# Patient Record
Sex: MALE | Employment: FULL TIME | URBAN - METROPOLITAN AREA
[De-identification: names, ages, dates, MRNs, and addresses within clinical notes are randomized per-mention and may not be internally consistent; named-entity substitution may affect disease eponyms.]

---

## 2021-10-12 ENCOUNTER — OFFICE VISIT (OUTPATIENT)
Dept: URGENT CARE | Facility: URGENT CARE | Age: 26
End: 2021-10-12
Payer: COMMERCIAL

## 2021-10-12 VITALS
SYSTOLIC BLOOD PRESSURE: 138 MMHG | OXYGEN SATURATION: 98 % | HEART RATE: 78 BPM | TEMPERATURE: 98 F | RESPIRATION RATE: 20 BRPM | DIASTOLIC BLOOD PRESSURE: 84 MMHG

## 2021-10-12 DIAGNOSIS — M54.6 RIGHT-SIDED THORACIC BACK PAIN, UNSPECIFIED CHRONICITY: Primary | ICD-10-CM

## 2021-10-12 PROCEDURE — 99203 OFFICE O/P NEW LOW 30 MIN: CPT | Performed by: FAMILY MEDICINE

## 2021-10-12 RX ORDER — CYCLOBENZAPRINE HCL 10 MG
10 TABLET ORAL 3 TIMES DAILY PRN
Qty: 10 TABLET | Refills: 0 | Status: SHIPPED | OUTPATIENT
Start: 2021-10-12

## 2021-10-12 ASSESSMENT — ENCOUNTER SYMPTOMS
HEMATURIA: 0
NAUSEA: 0
CONSTIPATION: 0
FREQUENCY: 0
ABDOMINAL PAIN: 0
UNEXPECTED WEIGHT CHANGE: 0
SHORTNESS OF BREATH: 0
FEVER: 0
DIARRHEA: 0
DYSURIA: 0
VOMITING: 0

## 2021-10-12 NOTE — PROGRESS NOTES
"Assessment & Plan     Jaron was seen today for urgent care and musculoskeletal problem.    Diagnoses and all orders for this visit:    Right-sided thoracic back pain, unspecified chronicity  -     cyclobenzaprine (FLEXERIL) 10 MG tablet; Take 1 tablet (10 mg) by mouth 3 times daily as needed for muscle spasms Do not drive within 8 hours of taking this medication.      Discussed risks and benefits of treatment strategies.    Patient Instructions     Stretching and strengthening exercises.     Heat/Ice 20 minutes three times daily.    Limit Aleve to 220 mg twice daily (with food) x 7 days.    Muscle relaxer (e.g. Flexeril) for breakthrough pain, only as prescribed.    Do not drive or fly within 8 hours of taking a muscle relaxer.    Follow up if your symptoms worsen or you are not improving over the next week.    Consider Physical Therapy in the future, only as appropriate.    Follow up in the ER immediately if you develop emergent symptoms, such as:  breathing concerns, severe/worsening back pain, progressive weakness, or incontinence.        Return for Follow up, as noted in Patient Instructions.    Gilda Harrell MD  [facility] ANURADHAALAN Salmeron is a 26 year old male, who presents to clinic today for the following health issues:    Chief Complaint   Patient presents with     Urgent Care     Musculoskeletal Problem     back pain        HPI    Back Pain    Onset of symptoms was 2 day(s) ago.  Location: right middle of back  Back pain feels \"like a stabbing\" with movement, currently 4-5/10 severity.  Radiation: does not radiate  Context: No injury, but back pain started after sitting in a simulator for 2 hours.  Severity of back pain was 10/10 initially, but it is 4-5/10 severity and improving the past 24 hours.  Denies: fecal incontinence, urinary incontinence, lower extremity numbness, lower extremity weakness and paresthesia    Aggravating Factors: changing positions " or prolonged sitting  Therapies to improve symptoms include: heat, Epson salts, and Aleve 220 mg  Past history: no prior back problems  Patient states that this is not a Worker's Compensation Case.  Patient is training to be a , currently visiting from [place].    Review of Systems   Constitutional: Negative for fever and unexpected weight change.   Respiratory: Negative for shortness of breath.    Cardiovascular: Negative for chest pain.   Gastrointestinal: Negative for abdominal pain, constipation, diarrhea, nausea and vomiting.   Genitourinary: Negative for dysuria, frequency and hematuria.         Objective    /84   Pulse 78   Temp 98  F (36.7  C) (Tympanic)   Resp 20   SpO2 98%   Physical Exam   GENERAL APPEARANCE:  Awake, alert, and in no acute distress.  PSYCHIATRIC:  Affect within normal limits.  HEENT:  Sclera anicteric.  No conjunctivitis.  No erythema, edema, or exudates of the oral mucosa or posterior pharynx.  NECK:  Spontaneous full range of motion.      HEART:  Normal S1, S2.  Regular rate and rhythm.  No murmurs, rubs, or gallops.  LUNGS: Lungs clear to auscultation.  No wheezes, rales, or rhonchi.  ABDOMEN:   Soft.  Not tender.  Not distended.  No mass.  BACK:  Back symmetric.  No curvature.  ROM full.  No CVA tenderness.  Midline tenderness not noted.  Paraspinal tenderness not noted, without evidence of muscle spasm.  Straight leg raise test negative bilaterally.  NEUROLOGIC:  Full range of motion and intact strength noted involving the lower extremities.  No weakness or sensory deficit.  Lower extremity reflexes are intact and symmetric.  No foot drop.  Gait non-antalgic.  Heel-toe-walk intact.  SKIN:  No rash.

## 2021-10-12 NOTE — PATIENT INSTRUCTIONS
Stretching and strengthening exercises.     Heat/Ice 20 minutes three times daily.    Limit Aleve to 220 mg twice daily (with food) x 7 days.    Muscle relaxer (e.g. Flexeril) for breakthrough pain, only as prescribed.    Do not drive or fly within 8 hours of taking a muscle relaxer.    Follow up if your symptoms worsen or you are not improving over the next week.    Consider Physical Therapy in the future, only as appropriate.    Follow up in the ER immediately if you develop emergent symptoms, such as:  breathing concerns, severe/worsening back pain, progressive weakness, or incontinence.

## 2024-02-28 ENCOUNTER — APPOINTMENT (RX ONLY)
Dept: URBAN - METROPOLITAN AREA CLINIC 91 | Facility: CLINIC | Age: 29
Setting detail: DERMATOLOGY
End: 2024-02-28

## 2024-02-28 DIAGNOSIS — L91.8 OTHER HYPERTROPHIC DISORDERS OF THE SKIN: ICD-10-CM | Status: STABLE

## 2024-02-28 DIAGNOSIS — L259 CONTACT DERMATITIS AND OTHER ECZEMA, UNSPECIFIED CAUSE: ICD-10-CM | Status: INADEQUATELY CONTROLLED

## 2024-02-28 PROBLEM — L23.9 ALLERGIC CONTACT DERMATITIS, UNSPECIFIED CAUSE: Status: ACTIVE | Noted: 2024-02-28

## 2024-02-28 PROCEDURE — ? SKIN TAG REMOVAL (COSMETIC)

## 2024-02-28 PROCEDURE — 99203 OFFICE O/P NEW LOW 30 MIN: CPT

## 2024-02-28 PROCEDURE — ? COUNSELING

## 2024-02-28 PROCEDURE — ? PRESCRIPTION

## 2024-02-28 RX ORDER — TRETIONIN 0.5 MG/G
CREAM TOPICAL
Qty: 45 | Refills: 2 | Status: ERX | COMMUNITY
Start: 2024-02-28

## 2024-02-28 RX ORDER — TRIAMCINOLONE ACETONIDE 1 MG/G
CREAM TOPICAL BID
Qty: 454 | Refills: 1 | Status: ERX | COMMUNITY
Start: 2024-02-28

## 2024-02-28 RX ADMIN — TRIAMCINOLONE ACETONIDE: 1 CREAM TOPICAL at 00:00

## 2024-02-28 RX ADMIN — TRETIONIN: 0.5 CREAM TOPICAL at 00:00

## 2024-02-28 ASSESSMENT — LOCATION ZONE DERM: LOCATION ZONE: NECK

## 2024-02-28 ASSESSMENT — LOCATION SIMPLE DESCRIPTION DERM: LOCATION SIMPLE: LEFT ANTERIOR NECK

## 2024-02-28 ASSESSMENT — LOCATION DETAILED DESCRIPTION DERM
LOCATION DETAILED: LEFT CLAVICULAR NECK
LOCATION DETAILED: LEFT SUPERIOR ANTERIOR NECK

## 2024-02-28 NOTE — PROCEDURE: SKIN TAG REMOVAL (COSMETIC)
Anesthesia Type: 2% lidocaine with epinephrine
Anesthesia Volume In Cc: 1
Price (Use Numbers Only, No Special Characters Or $): 50
Removed With: electrocautery
Consent: Written consent obtained and the risks of skin tag removal was reviewed with the patient including but not limited to bleeding, pigmentary change, infection, pain, and remote possibility of scarring.
Detail Level: Detailed